# Patient Record
Sex: FEMALE | Race: WHITE | NOT HISPANIC OR LATINO | Employment: OTHER | URBAN - METROPOLITAN AREA
[De-identification: names, ages, dates, MRNs, and addresses within clinical notes are randomized per-mention and may not be internally consistent; named-entity substitution may affect disease eponyms.]

---

## 2017-04-15 ENCOUNTER — HOSPITAL ENCOUNTER (EMERGENCY)
Facility: HOSPITAL | Age: 62
Discharge: HOME | End: 2017-04-15
Attending: EMERGENCY MEDICINE
Payer: MEDICARE

## 2017-04-15 VITALS
SYSTOLIC BLOOD PRESSURE: 170 MMHG | HEIGHT: 69 IN | RESPIRATION RATE: 18 BRPM | WEIGHT: 224 LBS | DIASTOLIC BLOOD PRESSURE: 80 MMHG | BODY MASS INDEX: 33.18 KG/M2 | TEMPERATURE: 98 F | OXYGEN SATURATION: 94 % | HEART RATE: 79 BPM

## 2017-04-15 DIAGNOSIS — Z46.9 DEVICE FITTING OR ADJUSTMENT: ICD-10-CM

## 2017-04-15 DIAGNOSIS — Z43.0 TRACHEOSTOMY CARE: Primary | ICD-10-CM

## 2017-04-15 PROCEDURE — 99283 EMERGENCY DEPT VISIT LOW MDM: CPT

## 2017-04-15 NOTE — ED AVS SNAPSHOT
[facility]  [id number] [place] [id number]               Asya Aparicio   4/15/2017  8:00 PM   ED    Description:  Female : 1955   Department:  [facility]           Your Care was Coordinated By:     Provider Role From To    Bairon Mariano Jr., MD Attending Provider 04/15/17 2001 --      Reason for Visit     Device Issue           Diagnoses this Visit        Comments    Tracheostomy care    -  Primary     Device fitting or adjustment           ED Disposition     ED Disposition Condition Comment    Discharge  Follow up with your pulmonologist within one week.  Return to ER if symptoms persist or worsen.             To Do List           Follow-up Information     Follow up with Mark Laird MD. Schedule an appointment as soon as possible for a visit in 1 week.    Specialty:  Internal Medicine    Contact information:    [address] [place] [address]  [phone]          Follow up with [facility].    Specialty:  Emergency Medicine    Why:  As needed, If symptoms worsen    Contact information:    [address] [place] [id number] [phone]      [facility]     [facility] Care [facility] -  Assistance  Unless otherwise directed by your provider, please contact [facility], our nurse care line that is available for  assistance.     Registered nurses in the [facility] provide: appointment scheduling, clinical advisement, health education, and other advisory services.  Call: [phone] (toll free)               Medications           Message regarding Medications     Verify the changes and/or additions to your medication regime listed below are the same as discussed with your clinician today.  If any of these changes or additions are incorrect, please notify your healthcare provider.             Verify that the below list of medications is an accurate  "representation of the medications you are currently taking.  If none reported, the list may be blank. If incorrect, please contact your healthcare provider. Carry this list with you in case of emergency.           Current Medications     albuterol (PROVENTIL) 2.5 mg /3 mL (0.083 %) nebulizer solution Take 2.5 mg by nebulization every 6 (six) hours as needed for Wheezing.    esomeprazole (NEXIUM) 40 MG capsule Take 40 mg by mouth before breakfast.    furosemide (LASIX) 40 MG tablet Take 40 mg by mouth 2 (two) times daily.    levothyroxine (SYNTHROID) 112 MCG tablet Take 112 mcg by mouth once daily.           Clinical Reference Information           Your Vitals Were     BP Pulse Temp Resp Height Weight    126/74 (BP Location: Right arm, Patient Position: Sitting) 84 98.9 °F (37.2 °C) (Oral) 16 5' 9" (1.753 m) 101.6 kg (224 lb)    SpO2 BMI             92% 33.08 kg/m2         Allergies as of 4/15/2017        Reactions    Asa [Aspirin] Other (See Comments)    seizures    Nsaids (Non-steroidal Anti-inflammatory Drug)       Immunizations Administered on Date of Encounter - 4/15/2017     None      ED Micro, Lab, POCT     None      ED Imaging Orders     None      Discharge References/Attachments     TRACHEOSTOMY, CLEANING YOUR (ENGLISH)    TRACHEOSTOMY, SUCTIONING YOUR (ENGLISH)    TRACHEOSTOMY CARE (ENGLISH)    CARING FOR YOUR TRACHEOSTOMY TUBE AND STOMA, DISCHARGE INSTRUCTIONS (ENGLISH)      Smoking Cessation     If you would like to quit smoking:   You may be eligible for free services if you are a [place] resident and started smoking cigarettes before September 1, 1988.  Call the Smoking Cessation Trust ([facility]) toll free at (684) 551-4674 or (044) 931-1433.   Call 5-800-QUIT-NOW if you do not meet the above criteria.   Contact us via email: [email or web address]   View our website for more information: [email or web address]         [facility]  complies with applicable Federal civil rights laws " and does not discriminate on the basis of race, color, national origin, age, disability, or sex.        Language Assistance Services     ATTENTION: Language assistance services are available, free of charge. Please call [phone].      ATENCIÓN: Si habla anupama, tiene a langford disposición servicios gratuitos de asistencia lingüística. Llame al [phone].     CHÚ Ý: N?u b?n nói Ti?ng Vi?t, có các d?ch v? h? tr? ngôn ng? mi?n phí dành cho b?n. G?i s? [phone].

## 2017-04-16 NOTE — ED PROVIDER NOTES
SCRIBE #1 NOTE: I, Kirsty Alejandro, am scribing for, and in the presence of, Bairon Mariano Jr., MD. I have scribed the entire note.      History      Chief Complaint   Patient presents with    Device Issue     pt has total laryngectomy, reports was talking today and rubber piece fell out, reports prevents fluid going in area        Review of patient's allergies indicates:   Allergen Reactions    Asa [aspirin] Other (See Comments)     seizures    Nsaids (non-steroidal anti-inflammatory drug)         HPI   HPI    4/15/2017, 8:02 PM   History obtained from the patient      History of Present Illness: Asya Aparicio is a 62 y.o. female patient who presents to the Emergency Department for device issue which onset gradually today. Symptoms are constant and moderate in severity. Pt has total laryngectomy. Pt states that a rubber tube fell out that prevents fluid from going into lungs. Pt is in ED to replace rubber tube. No mitigating or exacerbating factors reported. No associated sxs. Patient denies any fever, n/v/d, abdominal pain, CP, SOB, chest tightness, coughing, flank pain, dysuria, hematuria, back pain, HA, dizziness, and all other sxs at this time. No further complaints or concerns at this time.         Arrival mode: Personal vehicle    PCP: Mark Laird MD       Past Medical History:  Past Medical History:   Diagnosis Date    Cancer     COPD (chronic obstructive pulmonary disease)     Throat cancer     Thyroid disease        Past Surgical History:  Past Surgical History:   Procedure Laterality Date    CHOLECYSTECTOMY      GASTROSTOMY TUBE PLACEMENT      HYSTERECTOMY      TOTAL LARYNGECTOMY      TRACHEOSTOMY TUBE PLACEMENT           Family History:  Unknown     Social History:  Social History     Social History Main Topics    Smoking status: Former Smoker    Smokeless tobacco: Unknown    Alcohol use No    Drug use: Unknown    Sexual activity: Unknown       ROS   Review of Systems   Constitutional:  Negative for fever.   HENT: Negative for sore throat.    Respiratory: Negative for cough, choking, chest tightness, shortness of breath and wheezing.         (+) device issue   Cardiovascular: Negative for chest pain.   Gastrointestinal: Negative for abdominal pain, constipation, diarrhea, nausea and vomiting.   Genitourinary: Negative for decreased urine volume, difficulty urinating, dysuria and hematuria.   Musculoskeletal: Negative for back pain.   Skin: Negative for rash.   Neurological: Negative for dizziness, weakness, light-headedness and headaches.   Hematological: Does not bruise/bleed easily.       Physical Exam    Initial Vitals   BP Pulse Resp Temp SpO2   04/15/17 1958 04/15/17 1958 04/15/17 1958 04/15/17 1958 04/15/17 1958   126/74 84 16 98.9 °F (37.2 °C) 92 %      Physical Exam  Nursing Notes and Vital Signs Reviewed.  Constitutional: Patient is in no acute distress. Awake and alert. Well-developed and well-nourished.  Head: Atraumatic. Normocephalic.  Eyes: PERRL. EOM intact. Conjunctivae are not pale. No scleral icterus.  ENT: Mucous membranes are moist. Oropharynx is clear and symmetri.    Neck: Supple. Full ROM. No lymphadenopathy.  Cardiovascular: Regular rate. Regular rhythm. No murmurs, rubs, or gallops. Distal pulses are 2+ and symmetric.  Pulmonary/Chest: No respiratory distress. Clear to auscultation bilaterally. No wheezing, rales, or rhonchi. Large stoma in trachea. No aspirations.   Abdominal: Soft and non-distended.  There is no tenderness.  No rebound, guarding, or rigidity. Good bowel sounds.  Musculoskeletal: Moves all extremities. No obvious deformities. No edema. No calf tenderness.  Skin: Warm and dry.  Neurological:  Alert, awake, and appropriate.  Normal speech.  No acute focal neurological deficits are appreciated.  Psychiatric: Normal affect. Good eye contact. Appropriate in content.    ED Course    Procedures  ED Vital Signs:  Vitals:    04/15/17 1958 04/15/17 2150   BP: 126/74  "(!) 170/80   Pulse: 84 79   Resp: 16 18   Temp: 98.9 °F (37.2 °C) 98.2 °F (36.8 °C)   TempSrc: Oral    SpO2: (!) 92% (!) 94%   Weight: 101.6 kg (224 lb)    Height: 5' 9" (1.753 m)               The Emergency Provider reviewed the vital signs and test results, which are outlined above.    ED Discussion     8:24 PM: Spoke with respiratory. Respiratory is in the process of searching for a laryngectomy tube. Respiratory will come and suction the pt.     9:37 PM: Reassessed pt at this time. 3.5 ET tube inserted to block fistula. Pt feels comfortable and is ready for discharge. Discussed with pt all pertinent ED information and plan of tx. Gave pt all f/u and return to the ED instructions. All questions and concerns were addressed at this time. Pt expresses understanding of information and instructions, and is comfortable with plan to discharge. Pt is stable for discharge.        ED Medication(s):  Medications - No data to display    Discharge Medication List as of 4/15/2017  9:55 PM          Follow-up Information     Follow up with Mark Laird MD. Schedule an appointment as soon as possible for a visit in 1 week.    Specialty:  Internal Medicine    Contact information:    [address] [facility] [address] [phone]          Follow up with [facility].    Specialty:  Emergency Medicine    Why:  As needed, If symptoms worsen    Contact information:    [address] [place] [id number] [phone]            Medical Decision Making              Scribe Attestation:   Scribe #1: I performed the above scribed service and the documentation accurately describes the services I performed. I attest to the accuracy of the note.    Attending:   Physician Attestation Statement for Scribe #1: I, Bairon Mariano Jr., MD, personally performed the services described in this documentation, as scribed by Kirsty Alejandro, in my presence, and it is both accurate and complete.          Clinical " Impression       ICD-10-CM ICD-9-CM   1. Tracheostomy care Z43.0 V55.0   2. Device fitting or adjustment Z46.9 V53.90       Disposition:   Disposition: Discharged  Condition: Stable         Bairon Mariano Jr., MD  04/17/17 0742